# Patient Record
Sex: FEMALE | Race: OTHER
[De-identification: names, ages, dates, MRNs, and addresses within clinical notes are randomized per-mention and may not be internally consistent; named-entity substitution may affect disease eponyms.]

---

## 2020-07-10 ENCOUNTER — HOSPITAL ENCOUNTER (EMERGENCY)
Dept: HOSPITAL 62 - ER | Age: 25
Discharge: HOME | End: 2020-07-10
Payer: OTHER GOVERNMENT

## 2020-07-10 VITALS — SYSTOLIC BLOOD PRESSURE: 123 MMHG | DIASTOLIC BLOOD PRESSURE: 76 MMHG

## 2020-07-10 DIAGNOSIS — O34.81: ICD-10-CM

## 2020-07-10 DIAGNOSIS — O23.591: ICD-10-CM

## 2020-07-10 DIAGNOSIS — Z3A.01: ICD-10-CM

## 2020-07-10 DIAGNOSIS — O41.8X10: Primary | ICD-10-CM

## 2020-07-10 DIAGNOSIS — K29.70: ICD-10-CM

## 2020-07-10 DIAGNOSIS — N83.201: ICD-10-CM

## 2020-07-10 DIAGNOSIS — O21.9: ICD-10-CM

## 2020-07-10 DIAGNOSIS — B96.89: ICD-10-CM

## 2020-07-10 LAB
ADD MANUAL DIFF: NO
ALBUMIN SERPL-MCNC: 4.5 G/DL (ref 3.5–5)
ALP SERPL-CCNC: 63 U/L (ref 38–126)
ANION GAP SERPL CALC-SCNC: 8 MMOL/L (ref 5–19)
APPEARANCE UR: (no result)
APTT PPP: YELLOW S
AST SERPL-CCNC: 25 U/L (ref 14–36)
BACTERIA (WET MOUNT): (no result)
BASOPHILS # BLD AUTO: 0.1 10^3/UL (ref 0–0.2)
BASOPHILS NFR BLD AUTO: 0.6 % (ref 0–2)
BILIRUB DIRECT SERPL-MCNC: 0 MG/DL (ref 0–0.4)
BILIRUB SERPL-MCNC: 0.4 MG/DL (ref 0.2–1.3)
BILIRUB UR QL STRIP: NEGATIVE
BUN SERPL-MCNC: 9 MG/DL (ref 7–20)
CALCIUM: 9.7 MG/DL (ref 8.4–10.2)
CHLAM PCR: NOT DETECTED
CHLORIDE SERPL-SCNC: 105 MMOL/L (ref 98–107)
CO2 SERPL-SCNC: 24 MMOL/L (ref 22–30)
EOSINOPHIL # BLD AUTO: 0.1 10^3/UL (ref 0–0.6)
EOSINOPHIL NFR BLD AUTO: 0.8 % (ref 0–6)
EPITHELIALS (WET MOUNT): (no result)
ERYTHROCYTE [DISTWIDTH] IN BLOOD BY AUTOMATED COUNT: 14.1 % (ref 11.5–14)
GLUCOSE SERPL-MCNC: 99 MG/DL (ref 75–110)
GLUCOSE UR STRIP-MCNC: NEGATIVE MG/DL
HCT VFR BLD CALC: 41.9 % (ref 36–47)
HGB BLD-MCNC: 14.4 G/DL (ref 12–15.5)
KETONES UR STRIP-MCNC: 20 MG/DL
LYMPHOCYTES # BLD AUTO: 2.9 10^3/UL (ref 0.5–4.7)
LYMPHOCYTES NFR BLD AUTO: 26.2 % (ref 13–45)
MCH RBC QN AUTO: 29.1 PG (ref 27–33.4)
MCHC RBC AUTO-ENTMCNC: 34.4 G/DL (ref 32–36)
MCV RBC AUTO: 85 FL (ref 80–97)
MONOCYTES # BLD AUTO: 0.8 10^3/UL (ref 0.1–1.4)
MONOCYTES NFR BLD AUTO: 6.8 % (ref 3–13)
NEUTROPHILS # BLD AUTO: 7.3 10^3/UL (ref 1.7–8.2)
NEUTS SEG NFR BLD AUTO: 65.6 % (ref 42–78)
NITRITE UR QL STRIP: NEGATIVE
PH UR STRIP: 6 [PH] (ref 5–9)
PLATELET # BLD: 343 10^3/UL (ref 150–450)
POTASSIUM SERPL-SCNC: 4.3 MMOL/L (ref 3.6–5)
PROT SERPL-MCNC: 8 G/DL (ref 6.3–8.2)
PROT UR STRIP-MCNC: 30 MG/DL
RBC # BLD AUTO: 4.95 10^6/UL (ref 3.72–5.28)
RBCS (WET MOUNT): (no result)
SP GR UR STRIP: 1.03
T.VAGINALIS (WET MOUNT): (no result)
TOTAL CELLS COUNTED % (AUTO): 100 %
UROBILINOGEN UR-MCNC: NEGATIVE MG/DL (ref ?–2)
WBC # BLD AUTO: 11.2 10^3/UL (ref 4–10.5)
WBCS (WET MOUNT): (no result)
YEAST (WET MOUNT): (no result)

## 2020-07-10 PROCEDURE — 86900 BLOOD TYPING SEROLOGIC ABO: CPT

## 2020-07-10 PROCEDURE — 86901 BLOOD TYPING SEROLOGIC RH(D): CPT

## 2020-07-10 PROCEDURE — 87591 N.GONORRHOEAE DNA AMP PROB: CPT

## 2020-07-10 PROCEDURE — 84702 CHORIONIC GONADOTROPIN TEST: CPT

## 2020-07-10 PROCEDURE — 87210 SMEAR WET MOUNT SALINE/INK: CPT

## 2020-07-10 PROCEDURE — 87491 CHLMYD TRACH DNA AMP PROBE: CPT

## 2020-07-10 PROCEDURE — 96361 HYDRATE IV INFUSION ADD-ON: CPT

## 2020-07-10 PROCEDURE — 99284 EMERGENCY DEPT VISIT MOD MDM: CPT

## 2020-07-10 PROCEDURE — 76817 TRANSVAGINAL US OBSTETRIC: CPT

## 2020-07-10 PROCEDURE — 36415 COLL VENOUS BLD VENIPUNCTURE: CPT

## 2020-07-10 PROCEDURE — 83690 ASSAY OF LIPASE: CPT

## 2020-07-10 PROCEDURE — 81001 URINALYSIS AUTO W/SCOPE: CPT

## 2020-07-10 PROCEDURE — 80053 COMPREHEN METABOLIC PANEL: CPT

## 2020-07-10 PROCEDURE — 85025 COMPLETE CBC W/AUTO DIFF WBC: CPT

## 2020-07-10 PROCEDURE — 93976 VASCULAR STUDY: CPT

## 2020-07-10 PROCEDURE — 96374 THER/PROPH/DIAG INJ IV PUSH: CPT

## 2020-07-10 NOTE — ER DOCUMENT REPORT
ED GI/





- General


Chief Complaint: Nausea/Vomiting


Stated Complaint: NAUSEA/VOMITING APPROX 8WKS PREG


Time Seen by Provider: 07/10/20 08:21


Notes: 





CHIEF COMPLAINT: Vomiting and pelvic pain





HPI: 24-year-old female presenting to the emergency department complaining of 

vomiting and pelvic pain.  Patient states she developed nausea vomiting over the

last 2 days along with significant heartburn.  States this was similar to when 

she was pregnant with her son.  Patient took a pregnancy test 2 days ago and it 

was positive.  She believes she is approximately 8 weeks by dates.  No vaginal 

bleeding or discharge but does report right pelvic pain intermittently.  Has not

yet seen OB/GYN.





ROS: See HPI - all other systems were reviewed and are otherwise negative


Constitutional: no fever or recent illness


Eyes: no drainage, no blurred vision


ENT: no runny nose, no sore throat


Cardiovascular:  no chest pain 


Resp: no SOB, no cough


GI: + vomiting, no diarrhea, positive pelvic abdominal pain


: no dysuria, no vaginal discharge, no vaginal bleeding


Integumentary: no rash 


Allergy: no hives 


Musculoskeletal: no extremity pain or swelling 


Neurological: no numbness/tingling, no weakness





MEDICATIONS: I agree with the patient medications as charted by the RN.





ALLERGIES: I agree with the allergies as charted by the RN.





PAST MEDICAL HISTORY/PAST SURGICAL HISTORY: Reviewed and agree as charted by RN.





SOCIAL HISTORY: Reviewed and agree as charted by RN.





FAMILY HISTORY: No significant familial comorbid conditions directly related to 

patient complaint





EXAM:


Reviewed vital signs as charted by RN.


CONSTITUTIONAL: Alert and oriented and responds appropriately to questions. 

Well-appearing; well-nourished


HEAD: Normocephalic; atraumatic


EYES: PERRL; Conjunctivae clear, sclerae non-icteric


ENT: normal nose; no rhinorrhea; moist mucous membranes; pharynx without lesions

noted


NECK: Supple without meningismus; non-tender; no cervical lymphadenopathy, no 

masses


CARD: RRR; no murmurs, no clicks, no rubs, no gallops; symmetric distal pulses


RESP: Normal chest excursion without splinting or tachypnea; breath sounds clear

and equal bilaterally; no wheezes, no rhonchi, no rales, 


ABD/GI: Normal bowel sounds; non-distended; soft, non-tender, no rebound, no 

guarding; no palpable organomegaly or masses


: Female nurse chaperone present. External genitalia normal. No skin lesions 

noted. Pelvic Exam: No active bleeding. No purulent discharge. Cervix appears 

normal. No CMT. cervical loss is closed.  No lesions or masses. Uterus enlarged 

consistent with 6 to 8-week gestation and non tender. Right/Left adnexa normal 

size and non tender.


BACK:  The back appears normal and is non-tender to palpation, there is no CVA 

tenderness


EXT: Normal ROM in all joints; non-tender to palpation; no cyanosis, no 

effusions, no edema   


SKIN: Normal color for age and race; warm; dry; good turgor; no acute lesions 

noted


NEURO: Moves all extremities equally; Motor and sensory function intact 


PSYCH: The patient's mood and manner are appropriate. Grooming and personal 

hygiene are appropriate. 





MDM: 24-year-old female presenting with pelvic pain nausea vomiting with 

pregnancy.  Will obtain screening labs, pelvic ultrasound to evaluate for 

possible ectopic





- Related Data


Allergies/Adverse Reactions: 


                                        





No Known Allergies Allergy (Unverified 07/10/20 06:15)


   








Home Medications: Naproxen





Past Medical History





- Social History


Smoking Status: Current Every Day Smoker


Frequency of alcohol use: None


Drug Abuse: None


Family History: Reviewed & Not Pertinent


Patient has homicidal ideation: No


Past Surgical History: Reports: Hx  Section





Physical Exam





- Vital signs


Vitals: 


                                        











Temp Pulse Resp BP Pulse Ox


 


 98.6 F   80   15   170/102 H  98 


 


 07/10/20 06:21  07/10/20 06:21  07/10/20 06:21  07/10/20 06:21  07/10/20 06:21














Course





- Re-evaluation


Re-evalutation: 





07/10/20 10:11


Discussed evaluation and results with the patient and her spouse.  They have an 

appointment on Monday at [facility].  Will also give referral to OB/GYN.  Will place 

patient on Pepcid for reflux.  Will place patient on Flagyl for vaginitis.  Will

place patient on Zofran for nausea.  Will place patient on prenatal vitamins


07/10/20 10:15


I did discuss with the patient the need to make sure that she gets her blood 

pressure checked by the OB/GYN for reevaluation





- Vital Signs


Vital signs: 


                                        











Temp Pulse Resp BP Pulse Ox


 


 98.6 F   80   15   150/79 H  98 


 


 07/10/20 06:21  07/10/20 06:21  07/10/20 06:21  07/10/20 08:24  07/10/20 06:21














- Laboratory


Result Diagrams: 


                                 07/10/20 08:30





                                 07/10/20 08:30


Laboratory results interpreted by me: 


                                        











  07/10/20 07/10/20 07/10/20





  08:30 08:30 08:30


 


WBC  11.2 H  


 


RDW  14.1 H  


 


Sodium   136.5 L 


 


Beta HCG, Quant   27988.00 H 


 


Urine Protein    30 H


 


Urine Ketones    20 H


 


Urine Blood    SMALL H














Discharge





- Discharge


Clinical Impression: 


 Reflux gastritis, Bacterial vaginitis





Nausea & vomiting


Qualifiers:


 Vomiting type: unspecified Vomiting Intractability: non-intractable Qualified 

Code(s): R11.2 - Nausea with vomiting, unspecified





Pregnancy


Qualifiers:


 Weeks of gestation: less than 8 weeks Qualified Code(s): Z3A.01 - Less than 8 

weeks gestation of pregnancy





Ovarian cyst


Qualifiers:


 Laterality: right Qualified Code(s): N83.201 - Unspecified ovarian cyst, right 

side





Subchorionic hemorrhage


Qualifiers:


 Fetus number: single or unspecified fetus Trimester: first trimester Qualified 

Code(s): O41.8X10 - Other specified disorders of amniotic fluid and membranes, 

first trimester, not applicable or unspecified; O46.8X1 - Other antepartum 

hemorrhage, first trimester





Condition: Stable


Disposition: HOME, SELF-CARE


Additional Instructions: 


Take the medications as prescribed.  Follow-up with OB/GYN for further 

evaluation and treatment call for appointment.  Low grease low spice diet.  Do 

not eat within 2 hours of going to sleep.  Elevate the head of your bed 6 inches

to help with reflux symptoms.  If you have onset of vaginal bleeding return for 

significant bleeding where you are saturating greater than 2 pads per hour for 2

hours consecutively


Prescriptions: 


Metronidazole [Flagyl 500 mg Tablet] 500 mg PO BID #14 tablet


Famotidine [Pepcid 20 mg Tablet] 20 mg PO BID #20 tablet


Prenatal No122/Iron/Folic Acid [Prenatal Multi Tablet] 1 each PO DAILY #30 

tablet


Ondansetron [Zofran Odt 4 mg Tablet] 1 - 2 tab PO Q4H PRN #15 tab.rapdis


 PRN Reason: For Nausea/Vomiting


Referrals: 


IASAC ROQUE MD [ACTIVE STAFF] - Follow up as needed

## 2020-07-10 NOTE — RADIOLOGY REPORT (SQ)
EXAM DESCRIPTION:  U/S OB TRANSVAG W/DOPPLER



IMAGES COMPLETED DATE/TIME:  7/10/2020 9:30 am



REASON FOR STUDY:  right pelvic pain



COMPARISON:  None.



TECHNIQUE:  Transvaginal static and realtime grayscale images acquired of the pelvis. Additional susanna
cted spectral and color Doppler images recorded. All images stored on PACs.

bHCG: Not available.

CLINICAL DATES:  LMP 5/13/2020.  FLAKO based on LMP 2/17/2021.  EGA based on LMP 8 weeks 2 days.



LIMITATIONS:  None.



FINDINGS:  FETUS:  Single Living intrauterine pregnancy.

ULTRASOUND EGA: 7 weeks 1 days.

ULTRASOUND FLAKO: 2/25/2021.

CRL:  1 cm.

FHR: 152  beats per minute.

FETAL SURVEY: Too early to assess.

AMNIOTIC FLUID: Too early to assess.

PLACENTA: Too early to assess.

SUBCHORIONIC BLEED: Yes.  It measures 2.6 x 3.3 x 1.6 cm.

UTERUS: The uterus measures 10.3 x 7.8 x 6.4 cm.  There is a gestational sac that contains a yolk sac
 and an embryo.

CERVICAL LENGTH:  3.2 cm.   Closed.

RIGHT ADNEXA: The right ovary measures 5.5 x 4.2 x 4.2 cm and on Doppler there is intact arterial inf
low and venous outflow within the ovarian stroma per.  There is a anechoic cyst with posterior acoust
ic enhancement in the cyst that measures 4.6 x 3.8 x 3.7 cm.

LEFT ADNEXA: Unable to visualize the left ovary due to overlying bowel.  There is no adnexal mass.

FREE FLUID: None.

OTHER: No other finding.



IMPRESSION:  LIVE INTRAUTERINE PREGNANCY.

EGA 7 weeks 1 day based on ultrasound (since there is a discrepancy between the ultrasound dating and
 LMP dating that is more than 5 days).

Subchronic hemorrhage that measures 2.6 x 3.3 x 1.6 cm.

Right ovarian cyst that measures 4.6 x 3.8 x 3.7 cm.

Nonvisualization of the left ovary.  There is no adnexal mass.

Trimester of pregnancy: First trimester - 0 to 13 weeks.



TECHNICAL DOCUMENTATION:  JOB ID:  [id number]

 [employer]- All Rights Reserved                            rev-5/18



Reading location - IP/workstation name: [email or web address]